# Patient Record
Sex: MALE | Race: OTHER | HISPANIC OR LATINO
[De-identification: names, ages, dates, MRNs, and addresses within clinical notes are randomized per-mention and may not be internally consistent; named-entity substitution may affect disease eponyms.]

---

## 2021-02-23 ENCOUNTER — TRANSCRIPTION ENCOUNTER (OUTPATIENT)
Age: 36
End: 2021-02-23

## 2024-02-14 ENCOUNTER — APPOINTMENT (OUTPATIENT)
Dept: ORTHOPEDIC SURGERY | Facility: CLINIC | Age: 39
End: 2024-02-14
Payer: SELF-PAY

## 2024-02-14 VITALS — BODY MASS INDEX: 29.16 KG/M2 | HEIGHT: 73 IN | WEIGHT: 220 LBS

## 2024-02-14 DIAGNOSIS — Z87.891 PERSONAL HISTORY OF NICOTINE DEPENDENCE: ICD-10-CM

## 2024-02-14 DIAGNOSIS — Z87.19 PERSONAL HISTORY OF OTHER DISEASES OF THE DIGESTIVE SYSTEM: ICD-10-CM

## 2024-02-14 DIAGNOSIS — Z78.9 OTHER SPECIFIED HEALTH STATUS: ICD-10-CM

## 2024-02-14 DIAGNOSIS — Z86.69 PERSONAL HISTORY OF OTHER DISEASES OF THE NERVOUS SYSTEM AND SENSE ORGANS: ICD-10-CM

## 2024-02-14 DIAGNOSIS — S86.012A STRAIN OF LEFT ACHILLES TENDON, INITIAL ENCOUNTER: ICD-10-CM

## 2024-02-14 PROBLEM — Z00.00 ENCOUNTER FOR PREVENTIVE HEALTH EXAMINATION: Status: ACTIVE | Noted: 2024-02-14

## 2024-02-14 PROCEDURE — 99204 OFFICE O/P NEW MOD 45 MIN: CPT

## 2024-02-14 NOTE — PHYSICAL EXAM
[de-identified] : Examination of the left foot and ankle is as follows: INSPECTION: mild swelling over achilles tendon, but no abrasion, no laceration, no erythema, no ecchymosis, no gross deformity, no ecchymosis of foot or ankle PALPATION: achilles tendon tenderness with palpable defect at midsubstance ROM: dorsiflexion 20 degrees, plantar flexion 40 degrees, inversion 30 degrees, eversion 20 degrees STRENGTH: dorsiflexion 5/5, plantarflexion 5/5, inversion 5/5, eversion 5/5, EHL 5/5, FHL 5/5 TESTING: positive Rouse test VASCULAR: dorsalis pedis pulse: 2+, posterior tibialis pulse: 2+ NEURO: Sensation present to light touch in all distributions GAIT: mildly antalgic, but patient ambulates without assistive device  X-rays of the left ankle is as follows:  Ankle 3 View: There are no fractures, subluxations or dislocations. No significant abnormalities are seen

## 2024-02-14 NOTE — HISTORY OF PRESENT ILLNESS
[Sudden] : sudden [Dull/Aching] : dull/aching [Throbbing] : throbbing [Tingling] : tingling [Intermittent] : intermittent [Household chores] : household chores [Leisure] : leisure [Social interactions] : social interactions [Rest] : rest [Full time] : Work status: full time [de-identified] : Patient here for left ankle. Patient had x-ray at urgent care. Patient states he was playing volleyball and felt a pop when he jumped down on 2/9/2024. Patient states he has numbness in his Achilles. Patient has been trying to be NWB. Patient came into office NWB with crutches.   [] : Post Surgical Visit: no [FreeTextEntry1] : Left ankle  [FreeTextEntry3] : 2/9/2024 [FreeTextEntry5] : Patient states he was playing volleyball and felt a pop [de-identified] : Movement  [de-identified] :   WDL

## 2024-02-14 NOTE — ASSESSMENT
[FreeTextEntry1] : 39yo male with left achilles tendon rupture.  Discussed nonsurgical vs surgical options with patient.  Patient would like to proceed with surgical repair.  1.  nwb LLE with crutches 2.  ho strenuous activities -Rest, ice, compression, elevation, NSAIDs PRN for pain.  -All questions answered -F/u after surgery -Risks, benefits, and alternatives of surgery discussed with patient.  Risks including, but not limited to infection, blood loss, tendon damage, nerve damage, muscle damage, stiffness, pain, re-rupture, potential need for secondary surgery, loss of function, limb or life. Patient understands, and was allowed to voice questions and concerns. Patient agrees to surgery.  The diagnosis was explained in detail. The potential non-surgical and surgical treatments were reviewed. The relative risks and benefits of each option were considered relative to the patients age, activity level, medical history, symptom severity and previously attempted treatments.  The patient was advised to consult with their primary medical provider prior to initiation of any new medications to reduce the risk of adverse effects specific to their long-term home medications and medical history. The risk of gastrointestinal irritation and kidney injury specific to long-term NSAID use was discussed.

## 2024-02-15 ENCOUNTER — APPOINTMENT (OUTPATIENT)
Dept: ORTHOPEDIC SURGERY | Facility: AMBULATORY SURGERY CENTER | Age: 39
End: 2024-02-15

## 2024-02-29 ENCOUNTER — APPOINTMENT (OUTPATIENT)
Dept: ORTHOPEDIC SURGERY | Facility: AMBULATORY SURGERY CENTER | Age: 39
End: 2024-02-29